# Patient Record
Sex: FEMALE | Race: WHITE
[De-identification: names, ages, dates, MRNs, and addresses within clinical notes are randomized per-mention and may not be internally consistent; named-entity substitution may affect disease eponyms.]

---

## 2019-08-18 ENCOUNTER — HOSPITAL ENCOUNTER (EMERGENCY)
Dept: HOSPITAL 75 - ER FS | Age: 44
Discharge: HOME | End: 2019-08-18
Payer: SELF-PAY

## 2019-08-18 VITALS — BODY MASS INDEX: 46.18 KG/M2 | HEIGHT: 58 IN | WEIGHT: 220 LBS

## 2019-08-18 VITALS — SYSTOLIC BLOOD PRESSURE: 110 MMHG | DIASTOLIC BLOOD PRESSURE: 69 MMHG

## 2019-08-18 DIAGNOSIS — Z87.828: ICD-10-CM

## 2019-08-18 DIAGNOSIS — M54.12: Primary | ICD-10-CM

## 2019-08-18 DIAGNOSIS — F17.200: ICD-10-CM

## 2019-08-18 PROCEDURE — 99284 EMERGENCY DEPT VISIT MOD MDM: CPT
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
## 2020-03-23 ENCOUNTER — HOSPITAL ENCOUNTER (EMERGENCY)
Dept: HOSPITAL 75 - ER FS | Age: 45
Discharge: HOME | End: 2020-03-23
Payer: SELF-PAY

## 2020-03-23 VITALS — SYSTOLIC BLOOD PRESSURE: 106 MMHG | DIASTOLIC BLOOD PRESSURE: 105 MMHG

## 2020-03-23 DIAGNOSIS — F17.210: ICD-10-CM

## 2020-03-23 DIAGNOSIS — J45.909: ICD-10-CM

## 2020-03-23 DIAGNOSIS — J06.9: Primary | ICD-10-CM

## 2020-03-23 LAB
ALBUMIN SERPL-MCNC: 4 GM/DL (ref 3.2–4.5)
ALP SERPL-CCNC: 55 U/L (ref 40–136)
ALT SERPL-CCNC: 16 U/L (ref 0–55)
BASOPHILS # BLD AUTO: 0 10^3/UL (ref 0–0.1)
BASOPHILS NFR BLD AUTO: 0 % (ref 0–10)
BILIRUB SERPL-MCNC: 0.5 MG/DL (ref 0.1–1)
BUN/CREAT SERPL: 13
CALCIUM SERPL-MCNC: 9 MG/DL (ref 8.5–10.1)
CHLORIDE SERPL-SCNC: 107 MMOL/L (ref 98–107)
CO2 SERPL-SCNC: 21 MMOL/L (ref 21–32)
CREAT SERPL-MCNC: 0.72 MG/DL (ref 0.6–1.3)
EOSINOPHIL # BLD AUTO: 0.1 10^3/UL (ref 0–0.3)
EOSINOPHIL NFR BLD AUTO: 1 % (ref 0–10)
ERYTHROCYTE [DISTWIDTH] IN BLOOD BY AUTOMATED COUNT: 12.6 % (ref 10–14.5)
GFR SERPLBLD BASED ON 1.73 SQ M-ARVRAT: > 60 ML/MIN
GLUCOSE SERPL-MCNC: 107 MG/DL (ref 70–105)
HCT VFR BLD CALC: 44 % (ref 35–52)
HGB BLD-MCNC: 15 G/DL (ref 11.5–16)
LYMPHOCYTES # BLD AUTO: 1.7 X 10^3 (ref 1–4)
LYMPHOCYTES NFR BLD AUTO: 21 % (ref 12–44)
MANUAL DIFFERENTIAL PERFORMED BLD QL: NO
MCH RBC QN AUTO: 30 PG (ref 25–34)
MCHC RBC AUTO-ENTMCNC: 34 G/DL (ref 32–36)
MCV RBC AUTO: 88 FL (ref 80–99)
MONOCYTES # BLD AUTO: 0.5 X 10^3 (ref 0–1)
MONOCYTES NFR BLD AUTO: 7 % (ref 0–12)
NEUTROPHILS # BLD AUTO: 5.5 X 10^3 (ref 1.8–7.8)
NEUTROPHILS NFR BLD AUTO: 71 % (ref 42–75)
PLATELET # BLD: 251 10^3/UL (ref 130–400)
PMV BLD AUTO: 9.7 FL (ref 7.4–10.4)
POTASSIUM SERPL-SCNC: 3.9 MMOL/L (ref 3.6–5)
PROT SERPL-MCNC: 7 GM/DL (ref 6.4–8.2)
SODIUM SERPL-SCNC: 138 MMOL/L (ref 135–145)
WBC # BLD AUTO: 7.8 10^3/UL (ref 4.3–11)

## 2020-03-23 PROCEDURE — 80053 COMPREHEN METABOLIC PANEL: CPT

## 2020-03-23 PROCEDURE — 71046 X-RAY EXAM CHEST 2 VIEWS: CPT

## 2020-03-23 PROCEDURE — 87430 STREP A AG IA: CPT

## 2020-03-23 PROCEDURE — 36415 COLL VENOUS BLD VENIPUNCTURE: CPT

## 2020-03-23 PROCEDURE — 85025 COMPLETE CBC W/AUTO DIFF WBC: CPT

## 2020-03-23 PROCEDURE — 87804 INFLUENZA ASSAY W/OPTIC: CPT

## 2020-03-23 NOTE — DIAGNOSTIC IMAGING REPORT
INDICATION: 

Cough, fever, and headache.



TIME OF EXAMINATION:    

3:52 PM.



COMPARISON: 

No prior studies are available for comparison.



FINDINGS:

The heart size is normal. The pulmonary vascularity is

unremarkable. The lungs are clear. No infiltrate, effusion, or

pneumothorax is detected.



IMPRESSION: 

No acute cardiopulmonary process is detected.



Dictated by: 



  Dictated on workstation # [id number]

## 2020-03-23 NOTE — ED COUGH/URI
General


Chief Complaint:  Cough/Cold/Flu Symptoms


Stated Complaint:  FEVER/COUGH/NAUSEA SHOULDERS/NECK PAIN HEADACHE


Source:  patient


Exam Limitations:  no limitations





History of Present Illness


Date Seen by Provider:  Mar 23, 2020


Time Seen by Provider:  14:44


Initial Comments


44 yr old female with URI symptoms and reports a 99 fever. Patient reports that 

she's been feeling sick for the past day. She also states her  has had 

diarrhea. Patient admits to symptoms of cold coryza with minimal cough 

nonproductive. She has no chest pain she does have some shoulder neck pain and a

headache. Patient denies any possibility of pregnancy and has had a prior 

history of a tubal ligation. The patient has given informed consent for 

diagnostic and therapeutic services. The patient does have a history of asthma 

but has not used an inhaler for the past 2 months. She does smoke about half 

pack of cigarettes per day and has been trying to stop recently. No history of 

cardiovascular GI or renal disease. 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 Patient has no history of travel no history of contact with 

known COVID-19 infected individuals no history of pneumonia or high fevers.


Timing/Duration:  yesterday


Severity/Quality:  mild


Prior Episodes/Possible Cause:  occasional episodes


Modifying Factors:  Improves With Activity, Improves With Rest


Associated Symptoms:  cough, sore throat





Allergies and Home Medications


Allergies


Coded Allergies:  


     No Known Drug Allergies (Unverified , 8/18/19)





Home Medications


Cyclobenzaprine HCl 10 Mg Tablet, 10 MG PO Q8H PRN for SPASMS


   Prescribed by: ROSALES LOWERY on 8/18/19 1732


Gabapentin 100 Mg Capsule, 100 MG PO Q8H PRN for PAIN-BREAKTHROUGH


   Prescribed by: ROSALES LOWERY on 8/18/19 1732


Naproxen 500 Mg Tablet, 500 MG PO BID


   Prescribed by: ROSALES LOWERY on 8/18/19 1732





Patient Home Medication List


Home Medication List Reviewed:  Yes





Review of Systems


Review of Systems


Constitutional:  see HPI, chills, malaise, weakness


EENTM:  dental problems (edentulous), nose congestion, throat pain


Respiratory:  cough


Cardiovascular:  no symptoms reported


Gastrointestinal:  no symptoms reported


Genitourinary:  no symptoms reported


Pregnant:  No (tubal ligation)


Musculoskeletal:  back pain, muscle stiffness (bilateral shoulder stiffness), 

neck pain


Skin:  no symptoms reported


Psychiatric/Neurological:  Anxiety, Weakness


Hematologic/Lymphatic:  No Symptoms Reported


Immunological/Allergic:  no symptoms reported





Past Medical-Social-Family Hx


Past Med/Social Hx:  Reviewed Nursing Past Med/Soc Hx


Patient Social History


Alcohol Use:  Occasionally Uses


Recreational Drug Use:  No


Smoking Status:  Current Everyday Smoker


Type Used:  Cigarettes


2nd Hand Smoke Exposure:  No


Recent Foreign Travel:  No


Contact w/Someone Who Travel:  No


Recent Hopitalizations:  No





Immunizations Up To Date


Tetanus Booster (TDap):  Unknown





Seasonal Allergies


Seasonal Allergies:  No





Past Medical History


Surgeries:  Yes


Tubal Ligation


Respiratory:  Yes


Asthma


Cardiac:  No


Neurological:  No


GYN History:  Tubal Ligation


Genitourinary:  No


Gastrointestinal:  No


Musculoskeletal:  No


Endocrine:  No


HEENT:  No


Cancer:  No


Psychosocial:  No


Integumentary:  No


Blood Disorders:  No





Physical Exam





Vital Signs - First Documented








 3/23/20





 14:39


 


Temp 36.8


 


Pulse 87


 


Resp 16


 


B/P (MAP) 130/80 (97)


 


Pulse Ox 98





Capillary Refill :


Height: 4'10.00"


Weight: 220lbs. oz. 99.846715jy;  BMI


Method:Stated


General Appearance:  WD/WN, mild distress, obese


Eyes:  Bilateral Eye Normal Inspection, Bilateral Eye PERRL, Bilateral Eye EOMI


HEENT:  PERRL/EOMI, pharyngeal erythema


Neck:  limited range of motion (patient complains of minimal neck discomfort 

negative Kernig's and negative Brudzinski's)


Respiratory:  no respiratory distress, no accessory muscle use, rales (bilateral

bases improved with deep breathing consistent with smoker)


Cardiovascular:  normal peripheral pulses, regular rate, rhythm, no edema, no 

gallop, no JVD, no murmur


Gastrointestinal:  normal bowel sounds, non tender, soft, no organomegaly


Extremities:  normal range of motion, non-tender, normal inspection, no pedal 

edema, no calf tenderness, normal capillary refill


Neurologic/Psychiatric:  CNs II-XII nml as tested, no motor/sensory deficits, 

alert, normal mood/affect, oriented x 3


Skin:  normal color, warm/dry


Lymphatic:  no adenopathy





Progress/Results/Core Measures


Suspected Sepsis


SIRS


Temperature: 


Pulse:  


Respiratory Rate: 


 


Laboratory Tests


3/23/20 15:12: White Blood Count 7.8


Blood Pressure  / 


Mean: 


 











Laboratory Tests


3/23/20 15:12: 


Creatinine 0.72, Platelet Count 251, Total Bilirubin 0.5





Results/Orders


Lab Results





Laboratory Tests








Test


 3/23/20


14:47 3/23/20


15:12 Range/Units


 


 


Group A Streptococcus Screen NEGATIVE   NEGATIVE  


 


White Blood Count


 


 7.8 


 4.3-11.0


10^3/uL


 


Red Blood Count


 


 5.05 


 4.35-5.85


10^6/uL


 


Hemoglobin  15.0  11.5-16.0  G/DL


 


Hematocrit  44  35-52  %


 


Mean Corpuscular Volume  88  80-99  FL


 


Mean Corpuscular Hemoglobin  30  25-34  PG


 


Mean Corpuscular Hemoglobin


Concent 


 34 


 32-36  G/DL





 


Red Cell Distribution Width  12.6  10.0-14.5  %


 


Platelet Count


 


 251 


 130-400


10^3/uL


 


Mean Platelet Volume  9.7  7.4-10.4  FL


 


Neutrophils (%) (Auto)  71  42-75  %


 


Lymphocytes (%) (Auto)  21  12-44  %


 


Monocytes (%) (Auto)  7  0-12  %


 


Eosinophils (%) (Auto)  1  0-10  %


 


Basophils (%) (Auto)  0  0-10  %


 


Neutrophils # (Auto)  5.5  1.8-7.8  X 10^3


 


Lymphocytes # (Auto)  1.7  1.0-4.0  X 10^3


 


Monocytes # (Auto)  0.5  0.0-1.0  X 10^3


 


Eosinophils # (Auto)


 


 0.1 


 0.0-0.3


10^3/uL


 


Basophils # (Auto)


 


 0.0 


 0.0-0.1


10^3/uL


 


Sodium Level  138  135-145  MMOL/L


 


Potassium Level  3.9  3.6-5.0  MMOL/L


 


Chloride Level  107    MMOL/L


 


Carbon Dioxide Level  21  21-32  MMOL/L


 


Anion Gap  10  5-14  MMOL/L


 


Blood Urea Nitrogen  9  7-18  MG/DL


 


Creatinine


 


 0.72 


 0.60-1.30


MG/DL


 


Estimat Glomerular Filtration


Rate 


 > 60 


  





 


BUN/Creatinine Ratio  13   


 


Glucose Level  107 H   MG/DL


 


Calcium Level  9.0  8.5-10.1  MG/DL


 


Corrected Calcium  9.0  8.5-10.1  MG/DL


 


Total Bilirubin  0.5  0.1-1.0  MG/DL


 


Aspartate Amino Transf


(AST/SGOT) 


 15 


 5-34  U/L





 


Alanine Aminotransferase


(ALT/SGPT) 


 16 


 0-55  U/L





 


Alkaline Phosphatase  55    U/L


 


Total Protein  7.0  6.4-8.2  GM/DL


 


Albumin  4.0  3.2-4.5  GM/DL








Micro Results





Microbiology


3/23/20 Influenza Types A,B Antigen (EBONY) - Final, Complete


          





My Orders





Orders - DEYVI RIVAS DO


Influenza A And B Antigens (3/23/20 14:43)


Rapid Strep A Screen (3/23/20 14:43)


Cbc With Automated Diff (3/23/20 15:30)


Comprehensive Metabolic Panel (3/23/20 15:30)


Chest Pa/Lat (2 View) (3/23/20 15:41)





Vital Signs/I&O











 3/23/20





 14:39


 


Temp 36.8


 


Pulse 87


 


Resp 16


 


B/P (MAP) 130/80 (97)


 


Pulse Ox 98





Capillary Refill :


Progress Note :  


   Time:  16:46


Progress Note


Patient continues to report upper respiratory tract type symptoms but has no 

shortness of breath no fever. Imaging shows normal chest x-ray laboratory 

evaluation shows a white count of 7.8 hemoglobin 15.0 glucose 107 strep screen 

was negative influenza A and B are negative. Patient would like to go back to 

work tomorrow and wanted to have a work excuse for today. We reviewed the signs 

of COVID-19 and she understands that if she develops high-grade fever shortness 

of breath coughing up any green or yellow sputum chest pains she should return 

to the emergency room. She does not appear to have any significant respiratory 

infection but rather has a mild cold with an upper respiratory symptoms. She has

no provider in the community and will contact the local community care clinics 

for ongoing care. She'll push fluids and she can take Tylenol for discomfort.





Departure


Impression





   Primary Impression:  


   Upper respiratory infection


Disposition:  01 HOME, SELF-CARE


Condition:  Improved





Departure-Patient Inst.


Decision time for Depature:  16:48


Referrals:  


NO,LOCAL PHYSICIAN (PCP)


Primary Care Physician


Patient Instructions:  Cough, Adult (DC), Cough, Runny Nose, and the Common Cold

(DC), Viral Upper Respiratory Infection, Adult (DC)





Add. Discharge Instructions:  


Patient has a mild upper respiratory tract infection no strep no fluid or food 

B. No evidence of COVID-19. Patient was encouraged to rest drink plenty of 

fluids she should stop smoking which has aggravated her URI. She will contact 

the local community care clinic to make arrangements for follow-up.





All discharge instructions reviewed with patient and/or family. Voiced 

understanding.


Work/School Note:  Family Work Note   Patient Received Medical Care In the [facility] Department On:  Mar 23, 2020


   Patient Will Be Able to Return to Work/School On:  Mar 24, 2020


   Patient Restrictions:  Fluids rest and tylenol for discomfort











DEYVI RIVAS DO            Mar 23, 2020 14:46
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
## 2022-01-09 ENCOUNTER — HOSPITAL ENCOUNTER (EMERGENCY)
Dept: HOSPITAL 75 - ER FS | Age: 47
Discharge: HOME | End: 2022-01-09
Payer: SELF-PAY

## 2022-01-09 VITALS — DIASTOLIC BLOOD PRESSURE: 73 MMHG | SYSTOLIC BLOOD PRESSURE: 128 MMHG

## 2022-01-09 VITALS — WEIGHT: 220.46 LBS | BODY MASS INDEX: 46.28 KG/M2 | HEIGHT: 57.87 IN

## 2022-01-09 DIAGNOSIS — R11.2: Primary | ICD-10-CM

## 2022-01-09 DIAGNOSIS — R10.13: ICD-10-CM

## 2022-01-09 DIAGNOSIS — J44.9: ICD-10-CM

## 2022-01-09 DIAGNOSIS — E66.9: ICD-10-CM

## 2022-01-09 LAB
ALBUMIN SERPL-MCNC: 4 GM/DL (ref 3.2–4.5)
ALP SERPL-CCNC: 73 U/L (ref 40–136)
ALT SERPL-CCNC: 13 U/L (ref 0–55)
APTT PPP: YELLOW S
BACTERIA #/AREA URNS HPF: (no result) /HPF
BASOPHILS # BLD AUTO: 0 10^3/UL (ref 0–0.1)
BASOPHILS NFR BLD AUTO: 0 % (ref 0–10)
BILIRUB SERPL-MCNC: 0.4 MG/DL (ref 0.1–1)
BILIRUB UR QL STRIP: NEGATIVE
BUN/CREAT SERPL: 11
CALCIUM SERPL-MCNC: 9.2 MG/DL (ref 8.5–10.1)
CHLORIDE SERPL-SCNC: 98 MMOL/L (ref 98–107)
CO2 SERPL-SCNC: 25 MMOL/L (ref 21–32)
CREAT SERPL-MCNC: 0.8 MG/DL (ref 0.6–1.3)
EOSINOPHIL # BLD AUTO: 0.1 10^3/UL (ref 0–0.3)
EOSINOPHIL NFR BLD AUTO: 1 % (ref 0–10)
EOSINOPHIL NFR BLD MANUAL: 2 %
FIBRINOGEN PPP-MCNC: CLEAR MG/DL
GFR SERPLBLD BASED ON 1.73 SQ M-ARVRAT: 78 ML/MIN
GLUCOSE SERPL-MCNC: 149 MG/DL (ref 70–105)
GLUCOSE UR STRIP-MCNC: NEGATIVE MG/DL
HCT VFR BLD CALC: 47 % (ref 35–52)
HGB BLD-MCNC: 15.6 G/DL (ref 11.5–16)
KETONES UR QL STRIP: (no result)
LEUKOCYTE ESTERASE UR QL STRIP: NEGATIVE
LIPASE SERPL-CCNC: 20 U/L (ref 8–78)
LYMPHOCYTES # BLD AUTO: 1.2 X 10^3 (ref 1–4)
LYMPHOCYTES NFR BLD AUTO: 7 % (ref 12–44)
MANUAL DIFFERENTIAL PERFORMED BLD QL: YES
MCH RBC QN AUTO: 29 PG (ref 25–34)
MCHC RBC AUTO-ENTMCNC: 33 G/DL (ref 32–36)
MCV RBC AUTO: 88 FL (ref 80–99)
MONOCYTES # BLD AUTO: 0.9 X 10^3 (ref 0–1)
MONOCYTES NFR BLD AUTO: 5 % (ref 0–12)
MONOCYTES NFR BLD: 3 %
NEUTROPHILS # BLD AUTO: 14.4 X 10^3 (ref 1.8–7.8)
NEUTROPHILS NFR BLD AUTO: 87 % (ref 42–75)
NEUTS BAND NFR BLD MANUAL: 89 %
NITRITE UR QL STRIP: NEGATIVE
PH UR STRIP: 7 [PH] (ref 5–9)
PLATELET # BLD: 248 10^3/UL (ref 130–400)
PMV BLD AUTO: 9.9 FL (ref 9–12.2)
POTASSIUM SERPL-SCNC: 3.6 MMOL/L (ref 3.6–5)
PROT SERPL-MCNC: 7 GM/DL (ref 6.4–8.2)
PROT UR QL STRIP: NEGATIVE
RBC #/AREA URNS HPF: (no result) /HPF
SODIUM SERPL-SCNC: 136 MMOL/L (ref 135–145)
SP GR UR STRIP: 1.01 (ref 1.02–1.02)
SQUAMOUS #/AREA URNS HPF: (no result) /HPF
TOXIC GRANULES BLD QL SMEAR: (no result)
VARIANT LYMPHS NFR BLD MANUAL: 6 %
WBC # BLD AUTO: 16.6 10^3/UL (ref 4.3–11)
WBC #/AREA URNS HPF: (no result) /HPF

## 2022-01-09 PROCEDURE — 81000 URINALYSIS NONAUTO W/SCOPE: CPT

## 2022-01-09 PROCEDURE — 36415 COLL VENOUS BLD VENIPUNCTURE: CPT

## 2022-01-09 PROCEDURE — 85027 COMPLETE CBC AUTOMATED: CPT

## 2022-01-09 PROCEDURE — 80053 COMPREHEN METABOLIC PANEL: CPT

## 2022-01-09 PROCEDURE — 85007 BL SMEAR W/DIFF WBC COUNT: CPT

## 2022-01-09 PROCEDURE — 83690 ASSAY OF LIPASE: CPT

## 2022-01-09 PROCEDURE — 84703 CHORIONIC GONADOTROPIN ASSAY: CPT

## 2022-01-09 NOTE — ED GI
General


Chief Complaint:  Abdominal/GI Problems


Stated Complaint:  NAUSEA/VOMITING


Nursing Triage Note:  


PT STATES SHE TOOK AN OTC MEDICATION FROM [facility] YESTERDAY AT 1700 AND BY 


1730 SHE WAS VOMITING. SHE REPORTS VOMITING 6-8 X SINCE THEN. SHE BELIEVES IT IS




A REACTION TO THE MEDICATION. OTC MED IS ASPIRIN 500/CAFFEINE 32.5 AND PT 


REPORTS TAKING TWO.


Source of Information:  Patient





History of Present Illness


Date Seen by Provider:  Jan 9, 2022


Time Seen by Provider:  04:11


Initial Comments


46-year-old female presenting with complaints of nausea vomiting since taking 

over-the-counter back pain medicine.  That medication had aspirin and caffeine 

in it and she does not usually take either of those medicines because of having 

reflux and heartburn.  She states that she has epigastric pain as well as pain 

going up and to her throat.  She feels that she has been throwing up so much 

that it was because her throat hurt.  She denies having any diarrhea.  She has 

had no fever or chills.  She reports having Covid last fall.  She continues to 

smoke a pack and a half a day.  She denies any pain with urination.  She reports

that she is starting to go through menopause and having some irregular menstrual

cycles.


Timing/Duration:  12 Hours


Severity/Quality:  Moderate, Burning, Cramping


Location:  Epigastric


Radiation:  Epigastric


Modifying Factors:  Worsens With Analgesics (Take over-the-counter medicines 

with aspirin and caffeine mafr it worse)


Associated Symptoms:  No Diaphoresis, No Fever/Chills; Fatigue, Heartburn, 

Nausea/Vomiting; No Shortness of Air, No Swelling/Mass in Abdomen, No Syncope, 

No Weakness





Allergies and Home Medications


Allergies


Coded Allergies:  


     No Known Drug Allergies (Unverified , 8/18/19)





Patient Home Medication List


Home Medication List Reviewed:  Yes


Cyclobenzaprine HCl (Cyclobenzaprine HCl) 10 Mg Tablet, 10 MG PO Q8H PRN for 

SPASMS


   Prescribed by: ROSALES LOWERY on 8/18/19 1732


Gabapentin (Gabapentin) 100 Mg Capsule, 100 MG PO Q8H PRN for PAIN-BREAKTHROUGH


   Prescribed by: ROSALES LOWERY on 8/18/19 1732


Naproxen (Naprosyn) 500 Mg Tablet, 500 MG PO BID


   Prescribed by: ROSALES LOWERY on 8/18/19 1732


Ondansetron (Ondansetron Odt) 4 Mg Tab.rapdis, 4 MG PO Q6H PRN for 

NAUSEA/VOMITING


   Prescribed by: NIRAJ PATTON on 1/9/22 0545





Review of Systems


Review of Systems


Constitutional:  No chills, No fever


EENTM:  No Symptoms Reported


Respiratory:  No Symptoms Reported


Cardiovascular:  No Symptoms Reported


Gastrointestinal:  See HPI


Genitourinary:  Denies Drainage


Musculoskeletal:  back pain (chronic back and neck pain), muscle cramps 

(recurrent muscle spasms and cramping associated with back and neck pain), neck 

pain (chronic neck and back pain)


Skin:  No rash


Psychiatric/Neurological:  Anxiety





Past Medical-Social-Family Hx


Patient Social History


Tobacco Use?:  Yes


Tobacco type used:  Cigarettes


Smoking Status:  Current Everyday Smoker


Use of E-Cig and/or Vaping dev:  No


Substance use?:  No


Alcohol Use?:  No


Pt feels they are or have been:  No





Immunizations Up To Date


Tetanus Booster (TDap):  Unknown





Seasonal Allergies


Seasonal Allergies:  No





Past Medical History


Surgeries:  Yes


Tubal Ligation


Respiratory:  Yes


Asthma, COPD


Cardiac:  No


Neurological:  No


Last Menstrual Period:  Dec 19, 2021


GYN History:  Tubal Ligation


Genitourinary:  No


Gastrointestinal:  No


Musculoskeletal:  No


Endocrine:  No


HEENT:  No


Cancer:  No


Psychosocial:  No


Integumentary:  No


Blood Disorders:  No





Physical Exam


Vital Signs





Vital Signs - First Documented








 1/9/22





 04:26


 


Temp 36.5


 


Pulse 85


 


Resp 14


 


B/P (MAP) 143/76 (98)


 


Pulse Ox 96


 


O2 Delivery Room Air





Capillary Refill :


Height/Weight/BMI


Height: 4'10.00"


Weight: 220lbs. oz. 99.350689tz; 46.00 BMI


Method:Stated


General Appearance:  no apparent distress, obese


HEENT:  pharynx normal


Neck:  non-tender, full range of motion, supple, normal inspection


Respiratory:  chest non-tender, lungs clear, normal breath sounds, no 

respiratory distress, no accessory muscle use


Cardiovascular:  normal peripheral pulses, regular rate, rhythm


Gastrointestinal:  normal bowel sounds, soft, no pulsatile mass; No distended, 

No guarding, No rebound; tenderness (Epigastric)


Rectal:  deferred


Extremities:  normal range of motion, non-tender, normal capillary refill


Neurologic/Psychiatric:  alert, oriented x 3


Skin:  normal color, warm/dry





Progress/Results/Core Measures


Results/Orders


Lab Results





Laboratory Tests








Test


 1/9/22


04:45 1/9/22


05:25 Range/Units


 


 


White Blood Count


 16.6 H


 


 4.3-11.0


10^3/uL


 


Red Blood Count


 5.38 H


 


 3.80-5.11


10^6/uL


 


Hemoglobin 15.6   11.5-16.0  g/dL


 


Hematocrit 47   35-52  %


 


Mean Corpuscular Volume 88   80-99  fL


 


Mean Corpuscular Hemoglobin 29   25-34  pg


 


Mean Corpuscular Hemoglobin


Concent 33 


 


 32-36  g/dL





 


Red Cell Distribution Width 13.3   10.0-14.5  %


 


Platelet Count


 248 


 


 130-400


10^3/uL


 


Mean Platelet Volume 9.9   9.0-12.2  fL


 


Neutrophils (%) (Auto) 87 H  42-75  %


 


Lymphocytes (%) (Auto) 7 L  12-44  %


 


Monocytes (%) (Auto) 5   0-12  %


 


Eosinophils (%) (Auto) 1   0-10  %


 


Basophils (%) (Auto) 0   0-10  %


 


Neutrophils # (Auto) 14.4 H  1.8-7.8  X 10^3


 


Lymphocytes # (Auto) 1.2   1.0-4.0  X 10^3


 


Monocytes # (Auto) 0.9   0.0-1.0  X 10^3


 


Eosinophils # (Auto)


 0.1 


 


 0.0-0.3


10^3/uL


 


Basophils # (Auto)


 0.0 


 


 0.0-0.1


10^3/uL


 


Neutrophils % (Manual) 89    %


 


Lymphocytes % (Manual) 6    %


 


Monocytes % (Manual) 3    %


 


Eosinophils % (Manual) 2    %


 


Toxic Granulation 4+    


 


Sodium Level 136   135-145  MMOL/L


 


Potassium Level 3.6   3.6-5.0  MMOL/L


 


Chloride Level 98     MMOL/L


 


Carbon Dioxide Level 25   21-32  MMOL/L


 


Anion Gap 13   5-14  MMOL/L


 


Blood Urea Nitrogen 9   7-18  MG/DL


 


Creatinine


 0.80 


 


 0.60-1.30


MG/DL


 


Estimat Glomerular Filtration


Rate 78 


 


  





 


BUN/Creatinine Ratio 11    


 


Glucose Level 149 H    MG/DL


 


Calcium Level 9.2   8.5-10.1  MG/DL


 


Corrected Calcium 9.2   8.5-10.1  MG/DL


 


Total Bilirubin 0.4   0.1-1.0  MG/DL


 


Aspartate Amino Transf


(AST/SGOT) 12 


 


 5-34  U/L





 


Alanine Aminotransferase


(ALT/SGPT) 13 


 


 0-55  U/L





 


Alkaline Phosphatase 73     U/L


 


Total Protein 7.0   6.4-8.2  GM/DL


 


Albumin 4.0   3.2-4.5  GM/DL


 


Lipase 20   8-78  U/L


 


Serum Pregnancy Test,


Qualitative NEGATIVE 


 


 NEGATIVE  





 


Urine Color  YELLOW   


 


Urine Clarity  CLEAR   


 


Urine pH  7.0  5-9  


 


Urine Specific Gravity  1.015 L 1.016-1.022  


 


Urine Protein  NEGATIVE  NEGATIVE  


 


Urine Glucose (UA)  NEGATIVE  NEGATIVE  


 


Urine Ketones  1+ H NEGATIVE  


 


Urine Nitrite  NEGATIVE  NEGATIVE  


 


Urine Bilirubin  NEGATIVE  NEGATIVE  


 


Urine Urobilinogen  0.2  < = 1.0  MG/DL


 


Urine Leukocyte Esterase  NEGATIVE  NEGATIVE  


 


Urine RBC (Auto)  NEGATIVE  NEGATIVE  


 


Urine RBC  0-2   /HPF


 


Urine WBC  NONE   /HPF


 


Urine Squamous Epithelial


Cells 


 25-50 H


  /HPF





 


Urine Crystals  NONE   /LPF


 


Urine Bacteria  TRACE   /HPF


 


Urine Casts  NONE   /LPF


 


Urine Mucus  SMALL H  /LPF


 


Urine Culture Indicated  NO   








My Orders





Orders - NIRAJ PATTON MD


Comprehensive Metabolic Panel (1/9/22 04:42)


Lipase (1/9/22 04:42)


Ua Culture If Indicated (1/9/22 04:42)


Ed Iv/Invasive Line Start (1/9/22 04:42)


Cbc With Automated Diff (1/9/22 04:42)


Ns Iv 1000 Ml (Sodium Chloride 0.9%) (1/9/22 04:42)


Ondansetron Injection (Zofran Injectio (1/9/22 04:42)


Pantoprazole Injection (Protonix Injecti (1/9/22 04:42)


Ketorolac Injection (Toradol Injection) (1/9/22 04:42)


Hcg,Qualitative Serum (1/9/22 04:47)


Manual Differential (1/9/22 04:45)


Rx-Ondansetron Po (Rx-Zofran Po) (1/9/22 05:45)





Vital Signs/I&O











 1/9/22 1/9/22





 04:26 05:56


 


Temp 36.5 


 


Pulse 85 79


 


Resp 14 14


 


B/P (MAP) 143/76 (98) 128/73


 


Pulse Ox 96 98


 


O2 Delivery Room Air Room Air














Blood Pressure Mean:                    98











Progress


Progress Note #1:  


Progress Note


Obtain basic labs and urine.  Give IV fluids for hydration, Zofran for nausea, 

Protonix for gastritis and stomach irritation, Toradol for epigastric pain.


Progress Note #2:  


   Time:  05:21


Progress Note


CBC showed mild elevation in her white blood cell count to 16,000.  This may be 

more stress related from her having nausea and vomiting all night.  Her 

chemistry panel was without acute significant abnormality other than mild 

elevation of her glucose.  She had a negative hCG test.


Progress Note #3:  


Progress Note


Pt feeling better and no emesis here. discharge with zofran and encouraged to 

establish care with [facility] for her back and neck pain issues and muscle spasms.





Departure


Impression





   Primary Impression:  


   Nausea and vomiting


   Qualified Codes:  R11.14 - Bilious vomiting


   Additional Impression:  


   Epigastric abdominal pain


Disposition:  01 HOME, SELF-CARE


Condition:  Stable





Departure-Patient Inst.


Decision time for Depature:  05:41


Referrals:  


NO,LOCAL PHYSICIAN (PCP)


Primary Care Physician








[facility] [place]


Patient Instructions:  Nausea and Vomiting, Adult ED, Gastritis ED, Acid Reflux,

Adult and Adolescent ED, Ulcer and Gastritis Diet





Add. Discharge Instructions:  


Avoid taking products with caffeine and aspirin as they can irritate the lining 

of your stomach and may contribute to your episode tonight of repeated vomiting.





Follow a low fat bland diet to help with stomach irritation





Use the dissolving nausea medicine to help keep your stomach settled and treat 

nausea so you can drink and eat better. 





Try to get established with a primary care provider in the [facility] clinic by calling

[phone]. They have federal and state funding to help see patients even 

without insurance and can help you get your medicines at a reduced price.





All discharge instructions reviewed with patient and/or family. Voiced 

understanding.


Scripts


Ondansetron (Ondansetron Odt) 4 Mg Tab.rapdis


4 MG PO Q6H PRN for NAUSEA/VOMITING for 3 Days, #12 TAB 0 Refills


   Prov: NIRAJ PATTON MD         1/9/22











NIRAJ PATTON MD                Jan 9, 2022 05:16